# Patient Record
Sex: FEMALE | Race: WHITE | NOT HISPANIC OR LATINO | Employment: OTHER | URBAN - NONMETROPOLITAN AREA
[De-identification: names, ages, dates, MRNs, and addresses within clinical notes are randomized per-mention and may not be internally consistent; named-entity substitution may affect disease eponyms.]

---

## 2020-10-18 ENCOUNTER — OFFICE VISIT (OUTPATIENT)
Dept: URGENT CARE | Facility: PHYSICIAN GROUP | Age: 70
End: 2020-10-18
Payer: MEDICARE

## 2020-10-18 VITALS
OXYGEN SATURATION: 94 % | DIASTOLIC BLOOD PRESSURE: 78 MMHG | WEIGHT: 169.8 LBS | HEIGHT: 64 IN | RESPIRATION RATE: 16 BRPM | BODY MASS INDEX: 28.99 KG/M2 | SYSTOLIC BLOOD PRESSURE: 128 MMHG | HEART RATE: 93 BPM | TEMPERATURE: 97.1 F

## 2020-10-18 DIAGNOSIS — H69.93 DYSFUNCTION OF BOTH EUSTACHIAN TUBES: ICD-10-CM

## 2020-10-18 DIAGNOSIS — J45.901 EXACERBATION OF ASTHMA, UNSPECIFIED ASTHMA SEVERITY, UNSPECIFIED WHETHER PERSISTENT: ICD-10-CM

## 2020-10-18 PROCEDURE — 99204 OFFICE O/P NEW MOD 45 MIN: CPT | Performed by: PHYSICIAN ASSISTANT

## 2020-10-18 RX ORDER — FOLIC ACID 0.8 MG
TABLET ORAL
COMMUNITY

## 2020-10-18 RX ORDER — AMOXICILLIN 500 MG
CAPSULE ORAL
COMMUNITY

## 2020-10-18 RX ORDER — OMEPRAZOLE 20 MG/1
20 CAPSULE, DELAYED RELEASE ORAL DAILY
COMMUNITY

## 2020-10-18 RX ORDER — CLONAZEPAM 1 MG/1
0.5 TABLET ORAL 2 TIMES DAILY
COMMUNITY

## 2020-10-18 RX ORDER — DOCUSATE CALCIUM 240 MG
240 CAPSULE ORAL 2 TIMES DAILY
COMMUNITY

## 2020-10-18 RX ORDER — ESTRADIOL 1 MG/1
0.5 TABLET ORAL DAILY
COMMUNITY

## 2020-10-18 RX ORDER — FUROSEMIDE 20 MG/1
20 TABLET ORAL 2 TIMES DAILY
COMMUNITY

## 2020-10-18 RX ORDER — TOPIRAMATE 25 MG/1
TABLET ORAL
COMMUNITY
Start: 2020-06-04

## 2020-10-18 RX ORDER — PREDNISONE 10 MG/1
TABLET ORAL
Qty: 1 QUANTITY SUFFICIENT | Refills: 0 | Status: SHIPPED | OUTPATIENT
Start: 2020-10-18

## 2020-10-18 RX ORDER — ARIPIPRAZOLE 5 MG/1
5 TABLET ORAL DAILY
COMMUNITY

## 2020-10-18 RX ORDER — HYDROCODONE/ACETAMINOPHEN 5 MG-500MG
TABLET ORAL
COMMUNITY

## 2020-10-18 NOTE — PROGRESS NOTES
Chief Complaint   Patient presents with   • Seasonal Allergies     nasal congeastion, congestion in eyes and ears, hits more in evening, asthma        HISTORY OF PRESENT ILLNESS: Patient is a 70 y.o. female who presents today for the following:    Patient comes in for evaluation of bilateral ear pain started soon as she arrived 4 days ago.  She is visiting from out of town and has issues with allergies every time she comes.  Her right ear hurts worse than the left.  She denies any drainage or difficulty hearing.  She complains of itchy watery eyes and difficulty breathing.  She has a history of asthma but her Ventolin is not helping very much.    There are no active problems to display for this patient.      Allergies:Codeine, Droperidol, Hydrocodone-acetaminophen, and Prochlorperazine    Current Outpatient Medications Ordered in Epic   Medication Sig Dispense Refill   • topiramate (TOPAMAX) 25 MG Tab TAKE 1 TABLET BY MOUTH EVERY DAY FOR 1 WEEK THEN INCREASE TO 1 TABLET TWICE A DAY     • omeprazole (PRILOSEC) 20 MG delayed-release capsule Take 20 mg by mouth every day.     • NORTRIPTYLINE HCL PO Take 100 mg by mouth.     • estradiol (ESTRACE) 1 MG Tab Take 0.5 mg by mouth every day.     • ARIPiprazole (ABILIFY) 5 MG tablet Take 5 mg by mouth every day.     • furosemide (LASIX) 20 MG Tab Take 20 mg by mouth 2 times a day.     • clonazePAM (KLONOPIN) 1 MG Tab Take 0.5 mg by mouth 2 times a day.     • Potassium Chloride (KLOR-CON 10 PO) Take  by mouth.     • Albuterol Sulfate (VENTOLIN HFA INH) Inhale  by mouth.     • Aspirin-Acetaminophen-Caffeine (EXCEDRIN PO) Take  by mouth.     • Semaglutide (OZEMPIC, 0.25 OR 0.5 MG/DOSE, SC) Inject  as instructed.     • Multiple Vitamins-Minerals (MULTI COMPLETE PO) Take  by mouth.     • docusate calcium (SURFAK) 240 MG Cap Take 240 mg by mouth 2 times a day.     • Omega-3 Fatty Acids (FISH OIL) 1200 MG Cap Take  by mouth.     • Magnesium 500 MG Cap Take  by mouth.     • Lutein 6  "MG Cap Take  by mouth.     • predniSONE (DELTASONE) 10 MG Tab 50 mg x 1 day; 40 mg x 1 day; 30 mg x 1 day; 20 mg x 1 day; 10 mg x 1 day 1 Quantity Sufficient 0     No current Epic-ordered facility-administered medications on file.        History reviewed. No pertinent past medical history.    Social History     Tobacco Use   • Smoking status: Never Smoker   • Smokeless tobacco: Never Used   Substance Use Topics   • Alcohol use: Not Currently   • Drug use: Not Currently       No family status information on file.   History reviewed. No pertinent family history.    Review of Systems:   Constitutional ROS: No unexpected change in weight, No weakness, No fatigue  Eye ROS: No recent significant change in vision, No eye pain, redness, discharge  Ear ROS: No drainage, No tinnitus or vertigo, No recent change in hearing  Mouth/Throat ROS: No teeth or gum problems, No bleeding gums, No tongue complaints  Neck ROS: No swollen glands, No significant pain in neck  Pulmonary ROS: No chronic cough, sputum, or hemoptysis, No dyspnea on exertion, No wheezing  Cardiovascular ROS: No diaphoresis, No edema, No palpitations  Musculoskeletal/Extremities ROS: No peripheral edema, No pain, redness or swelling on the joints  Hematologic/Lymphatic ROS: No chills, No night sweats, No weight loss  Skin/Integumentary ROS: No edema, No evidence of rash, No itching      Exam:  /78   Pulse 93   Temp 36.2 °C (97.1 °F) (Temporal)   Resp 16   Ht 1.626 m (5' 4\")   Wt 77 kg (169 lb 12.8 oz)   SpO2 94%   General: Well developed, well nourished. No distress.    Eye: PERRL/EOMI; conjunctivae clear, lids normal.  ENMT: Lips without lesions, MMM. Oropharynx is clear. Bilateral TMs are within normal limits but with serous effusions and bulging bilaterally.  Pulmonary: Unlabored respiratory effort. Lungs clear to auscultation, no wheezes, no rhonchi.   Cardiovascular: Regular rate and rhythm without murmur.   Neurologic: Grossly nonfocal. No " facial asymmetry noted.  Lymph: No cervical lymphadenopathy noted.  Skin: Warm, dry, good turgor. No rashes in visible areas.   Psych: Normal mood. Alert and oriented to person, place and time.    Assessment/Plan:  Discussed likely due to seasonal allergies. Discussed appropriate over-the-counter symptomatic medication, and when to return to clinic. Follow up for worsening or persistent symptoms.  1. Exacerbation of asthma, unspecified asthma severity, unspecified whether persistent  predniSONE (DELTASONE) 10 MG Tab   2. Dysfunction of both eustachian tubes